# Patient Record
Sex: FEMALE | Race: WHITE | NOT HISPANIC OR LATINO | Employment: OTHER | URBAN - METROPOLITAN AREA
[De-identification: names, ages, dates, MRNs, and addresses within clinical notes are randomized per-mention and may not be internally consistent; named-entity substitution may affect disease eponyms.]

---

## 2017-02-02 ENCOUNTER — PROCEDURE VISIT (OUTPATIENT)
Dept: OBSTETRICS AND GYNECOLOGY | Facility: CLINIC | Age: 62
End: 2017-02-02

## 2017-02-02 VITALS — WEIGHT: 104 LBS | SYSTOLIC BLOOD PRESSURE: 110 MMHG | DIASTOLIC BLOOD PRESSURE: 64 MMHG

## 2017-02-02 DIAGNOSIS — Z00.00 ANNUAL PHYSICAL EXAM: Primary | ICD-10-CM

## 2017-02-02 PROBLEM — M06.9 RHEUMATOID ARTHRITIS INVOLVING MULTIPLE SITES (HCC): Status: ACTIVE | Noted: 2017-02-02

## 2017-02-02 PROCEDURE — 99396 PREV VISIT EST AGE 40-64: CPT | Performed by: OBSTETRICS & GYNECOLOGY

## 2017-02-02 RX ORDER — FOLIC ACID 1 MG/1
TABLET ORAL
COMMUNITY
Start: 2017-02-01

## 2017-02-02 NOTE — PROGRESS NOTES
Subjective     Chief Complaint   Patient presents with   • Gynecologic Exam     No concern this visit.       Carli Lea is a 61 y.o. year old  presenting to be seen for her annual exam.      She is sexually active.  In the past 12 months there have not been new sexual partners.  Condoms are not typically used.  She would not like to be screened for STD's at today's exam.     She exercises regularly: yes.  She wears her seat belt: yes.  She has concerns about domestic violence: no.  She has noticed changes in height: no    GYN screening history:  · Last pap: she does not know results of her last PAP.    No Additional Complaints Reported    The following portions of the patient's history were reviewed and updated as appropriate:vital signs, allergies, current medications, past medical history, past social history, past surgical history and problem list.    Review of Systems  Constitutional: negative for fever, chills, activity change, appetite change, fatigue and unexpected weight change.  Respiratory: negative  Cardiovascular: negative  Gastrointestinal: negative  Genitourinary:negative  Integument/breast: negative  Musculoskeletal:negative     Physical Exam    Objective     Visit Vitals   • /64   • Wt 104 lb (47.2 kg)       General:  well developed; well nourished  no acute distress  obese - There is no height or weight on file to calculate BMI.   Skin:  No suspicious lesions seen   Thyroid: normal to inspection and palpation   Lungs:  breathing is unlabored  clear to auscultation bilaterally   Heart:  regular rate and rhythm, S1, S2 normal, no murmur, click, rub or gallop   Breasts:  Examined in supine position  Symmetric without masses or skin dimpling  Nipples normal without inversion, lesions or discharge  There are no palpable axillary nodes   Abdomen: soft, non-tender; no masses  no umbilical or inginual hernias are present  no hepato-splenomegaly   Pelvis: Clinical staff was present for  exam  External genitalia:  normal appearance of the external genitalia including Bartholin's and [place]'s glands.  :  urethral meatus normal; urethral hypermobility is absent.  Vaginal:  atrophic mucosal changes are present;  Cervix:  normal appearance.  Uterus:  normal size, shape and consistency.  Adnexa:  normal bimanual exam of the adnexa.         Lab Review   No data reviewed    Imaging  No data reviewed    Assessment/Plan     ASSESSMENT  1. Annual physical exam        PLAN  No orders of the defined types were placed in this encounter.    New Medications Ordered This Visit   Medications   • folic acid (FOLVITE) 1 MG tablet   • methotrexate 2.5 MG tablet   • InFLIXimab (REMICADE IV)     Sig: Infuse  into a venous catheter.         Follow up: 1 year(s)         This note was electronically signed.    Jamison Oglesby MD  February 2, 2017

## 2018-03-05 ENCOUNTER — PROCEDURE VISIT (OUTPATIENT)
Dept: OBSTETRICS AND GYNECOLOGY | Facility: CLINIC | Age: 63
End: 2018-03-05

## 2018-03-05 VITALS
BODY MASS INDEX: 25.4 KG/M2 | HEART RATE: 62 BPM | WEIGHT: 121 LBS | DIASTOLIC BLOOD PRESSURE: 68 MMHG | HEIGHT: 58 IN | SYSTOLIC BLOOD PRESSURE: 110 MMHG

## 2018-03-05 DIAGNOSIS — Z00.00 ANNUAL PHYSICAL EXAM: Primary | ICD-10-CM

## 2018-03-05 PROCEDURE — 99396 PREV VISIT EST AGE 40-64: CPT | Performed by: OBSTETRICS & GYNECOLOGY

## 2018-03-05 NOTE — PROGRESS NOTES
"Subjective     Chief Complaint   Patient presents with   • Gynecologic Exam     pap       Carli Lea is a 62 y.o. year old  presenting to be seen for her annual exam.      She is sexually active.  In the past 12 months there have not been new sexual partners.  Condoms are not typically used.  She would not like to be screened for STD's at today's exam.     She exercises regularly: yes.  She wears her seat belt: yes.  She has concerns about domestic violence: no.  She has noticed changes in height: no    GYN screening history:  · Last pap: she reports her last PAP was normal  · Last mammogram: she reports her last mammogram was normal  · Last fasting lipid profile: she reports her last lipid panel was normal  · Last colonoscopy: she reports her last colonoscopy was normal  · Last DEXA: she reports her last DEXA was normal.    No Additional Complaints Reported    The following portions of the patient's history were reviewed and updated as appropriate:vital signs, allergies, current medications, past medical history, past social history, past surgical history and problem list.    Review of Systems  Pertinent items are noted in HPI.     Physical Exam    Objective     /68  Pulse 62  Ht 147.3 cm (58\")  Wt 54.9 kg (121 lb)  Breastfeeding? No  BMI 25.29 kg/m2    General:  well developed; well nourished  no acute distress   Constitutional: healthy   Skin:  No suspicious lesions seen   Thyroid: normal to inspection and palpation   Lungs:  breathing is unlabored  clear to auscultation bilaterally   Heart:  regular rate and rhythm, S1, S2 normal, no murmur, click, rub or gallop   Breasts:  Examined in supine position  Symmetric without masses or skin dimpling  Nipples normal without inversion, lesions or discharge  There are no palpable axillary nodes   Abdomen: soft, non-tender; no masses  no umbilical or inginual hernias are present  no hepato-splenomegaly   Pelvis: Clinical staff was present for " exam  External genitalia:  normal appearance of the external genitalia including Bartholin's and [place]'s glands.  :  urethral meatus normal; urethral hypermobility is absent.  Vaginal:  atrophic mucosal changes are present;  Cervix:  normal appearance  Uterus:  normal size, shape and consistency  Adnexa:  normal bimanual exam of the adnexa.   Musculoskeletal: negative   Neuro: normal without focal findings, mental status, speech normal, alert and oriented x3 and ISSAC   Psych: oriented to time, place and person, mood and affect are within normal limits, pt is a good historian; no memory problems were noted       Lab Review   No data reviewed    Imaging  Mammogram report    Assessment/Plan     ASSESSMENT  1. Annual physical exam        PLAN  No orders of the defined types were placed in this encounter.    No orders of the defined types were placed in this encounter.        Follow up: 1 year(s)         This note was electronically signed.    Jamison Oglesby MD  March 5, 2018

## 2018-03-12 ENCOUNTER — TELEPHONE (OUTPATIENT)
Dept: OBSTETRICS AND GYNECOLOGY | Facility: CLINIC | Age: 63
End: 2018-03-12

## 2019-04-11 ENCOUNTER — OFFICE VISIT (OUTPATIENT)
Dept: OBSTETRICS AND GYNECOLOGY | Facility: CLINIC | Age: 64
End: 2019-04-11

## 2019-04-11 VITALS
SYSTOLIC BLOOD PRESSURE: 118 MMHG | HEIGHT: 58 IN | WEIGHT: 114 LBS | DIASTOLIC BLOOD PRESSURE: 62 MMHG | BODY MASS INDEX: 23.93 KG/M2

## 2019-04-11 DIAGNOSIS — M81.0 OSTEOPOROSIS, UNSPECIFIED OSTEOPOROSIS TYPE, UNSPECIFIED PATHOLOGICAL FRACTURE PRESENCE: ICD-10-CM

## 2019-04-11 DIAGNOSIS — Z00.00 MEDICARE ANNUAL WELLNESS VISIT, INITIAL: Primary | ICD-10-CM

## 2019-04-11 DIAGNOSIS — Z12.39 BREAST SCREENING: ICD-10-CM

## 2019-04-11 PROCEDURE — 99397 PER PM REEVAL EST PAT 65+ YR: CPT | Performed by: OBSTETRICS & GYNECOLOGY

## 2019-04-11 NOTE — PROGRESS NOTES
"Subjective     Chief Complaint   Patient presents with   • Gynecologic Exam     pap smear       Carli Lea is a 63 y.o. year old  presenting to be seen for her annual exam.      She is sexually active.  In the past 12 months there have not been new sexual partners.  Condoms are not typically used.  She would not like to be screened for STD's at today's exam.     She exercises regularly: yes.  She wears her seat belt: yes.  She has concerns about domestic violence: no.  She has noticed changes in height: no    GYN screening history:  · Last pap: HPV  · Last mammogram: she reports her last mammogram was normal  · Last colonoscopy: Home test neg  · Last DEXA: stable osteopenia.    No Additional Complaints Reported    The following portions of the patient's history were reviewed and updated as appropriate:vital signs, allergies, current medications, past medical history, past social history, past surgical history and problem list.    Review of Systems  Pertinent items are noted in HPI.     Physical Exam    Objective     /62   Ht 147.3 cm (57.99\")   Wt 51.7 kg (114 lb)   Breastfeeding? No   BMI 23.83 kg/m²     General:  well developed; well nourished  no acute distress   Constitutional: thin and healthy   Skin:  No suspicious lesions seen   Thyroid: normal to inspection and palpation   Lungs:  breathing is unlabored  clear to auscultation bilaterally   Heart:  regular rate and rhythm, S1, S2 normal, no murmur, click, rub or gallop   Breasts:  Examined in supine position  Symmetric without masses or skin dimpling  Nipples normal without inversion, lesions or discharge  There are no palpable axillary nodes   Abdomen: soft, non-tender; no masses  no umbilical or inginual hernias are present  no hepato-splenomegaly   Pelvis: Clinical staff was present for exam  External genitalia:  normal appearance of the external genitalia including Bartholin's and [place]'s glands.  :  urethral meatus normal; urethral " hypermobility is absent.  Vaginal:  atrophic mucosal changes are present;  Cervix:  normal appearance  Uterus:  normal size, shape and consistency  Adnexa:  normal bimanual exam of the adnexa.   Musculoskeletal: negative   Neuro: normal without focal findings, mental status, speech normal, alert and oriented x3 and ISSAC   Psych: oriented to time, place and person, mood and affect are within normal limits, pt is a good historian; no memory problems were noted       Lab Review   No data reviewed    Imaging  No data reviewed    Assessment/Plan     ASSESSMENT  1. Medicare annual wellness visit, initial    2. Osteoporosis, unspecified osteoporosis type, unspecified pathological fracture presence    3. Breast screening        PLAN  Orders Placed This Encounter   Procedures   • Mammo Screening Digital Tomosynthesis Bilateral With CAD     Standing Status:   Future     Standing Expiration Date:   4/10/2020     Order Specific Question:   Reason for Exam:     Answer:   screen     No orders of the defined types were placed in this encounter.        Follow up: 1 year(s)         This note was electronically signed.    Jamiosn Oglesby MD  April 11, 2019

## 2021-04-26 ENCOUNTER — TELEPHONE (OUTPATIENT)
Dept: OBSTETRICS AND GYNECOLOGY | Facility: CLINIC | Age: 66
End: 2021-04-26

## 2021-04-26 NOTE — TELEPHONE ENCOUNTER
----- Message from Jamison Oglesby MD sent at 4/25/2021  1:49 PM EDT -----      ----- Message -----  From: Tara Mike, RegWen Robbins  Sent: 4/23/2021   2:58 PM EDT  To: Jamison Oglesby MD

## 2021-04-26 NOTE — TELEPHONE ENCOUNTER
Patient advised BIOIQ fit test not performed patient will need complete colorectal cancer screening.  Patient state she has an appointment with her PCP Maureen Cox in June and will get scheduled

## 2024-06-21 ENCOUNTER — TELEPHONE (OUTPATIENT)
Age: 69
End: 2024-06-21
Payer: MEDICARE

## 2024-06-21 RX ORDER — ALENDRONATE SODIUM 70 MG/1
70 TABLET ORAL WEEKLY
Qty: 12 TABLET | Refills: 1 | Status: SHIPPED | OUTPATIENT
Start: 2024-06-21

## 2024-06-21 NOTE — TELEPHONE ENCOUNTER
Pt came in to get appt scheduled due to no one answering the phones. I [facility] in the Rx [facility]. Pt is getting appt scheduled.

## 2024-07-24 ENCOUNTER — OFFICE VISIT (OUTPATIENT)
Age: 69
End: 2024-07-24
Payer: MEDICARE

## 2024-07-24 VITALS
WEIGHT: 110.7 LBS | HEART RATE: 76 BPM | TEMPERATURE: 97.5 F | SYSTOLIC BLOOD PRESSURE: 114 MMHG | BODY MASS INDEX: 23.24 KG/M2 | HEIGHT: 58 IN | DIASTOLIC BLOOD PRESSURE: 60 MMHG

## 2024-07-24 DIAGNOSIS — Z79.899 IMMUNODEFICIENCY DUE TO TREATMENT WITH IMMUNOSUPPRESSIVE MEDICATION: ICD-10-CM

## 2024-07-24 DIAGNOSIS — D84.821 IMMUNODEFICIENCY DUE TO TREATMENT WITH IMMUNOSUPPRESSIVE MEDICATION: ICD-10-CM

## 2024-07-24 DIAGNOSIS — M81.0 OSTEOPOROSIS, UNSPECIFIED OSTEOPOROSIS TYPE, UNSPECIFIED PATHOLOGICAL FRACTURE PRESENCE: ICD-10-CM

## 2024-07-24 DIAGNOSIS — M05.79 RHEUMATOID ARTHRITIS INVOLVING MULTIPLE SITES WITH POSITIVE RHEUMATOID FACTOR: Primary | ICD-10-CM

## 2024-07-24 DIAGNOSIS — R53.83 FATIGUE, UNSPECIFIED TYPE: ICD-10-CM

## 2024-07-24 DIAGNOSIS — Z78.0 POST-MENOPAUSE: ICD-10-CM

## 2024-07-24 DIAGNOSIS — Z79.899 ENCOUNTER FOR LONG-TERM (CURRENT) USE OF HIGH-RISK MEDICATION: ICD-10-CM

## 2024-07-24 PROBLEM — Z79.60 IMMUNODEFICIENCY DUE TO TREATMENT WITH IMMUNOSUPPRESSIVE MEDICATION: Status: ACTIVE | Noted: 2024-07-24

## 2024-07-24 PROBLEM — M54.50 LOW BACK PAIN: Status: ACTIVE | Noted: 2021-06-03

## 2024-07-24 PROBLEM — E78.5 DYSLIPIDEMIA: Status: ACTIVE | Noted: 2021-06-02

## 2024-07-24 PROBLEM — T45.1X5A IMMUNODEFICIENCY DUE TO TREATMENT WITH IMMUNOSUPPRESSIVE MEDICATION: Status: ACTIVE | Noted: 2024-07-24

## 2024-07-24 PROCEDURE — 1159F MED LIST DOCD IN RCRD: CPT | Performed by: NURSE PRACTITIONER

## 2024-07-24 PROCEDURE — 1160F RVW MEDS BY RX/DR IN RCRD: CPT | Performed by: NURSE PRACTITIONER

## 2024-07-24 PROCEDURE — G2211 COMPLEX E/M VISIT ADD ON: HCPCS | Performed by: NURSE PRACTITIONER

## 2024-07-24 PROCEDURE — 99214 OFFICE O/P EST MOD 30 MIN: CPT | Performed by: NURSE PRACTITIONER

## 2024-07-24 RX ORDER — METHOTREXATE 2.5 MG/1
17.5 TABLET ORAL WEEKLY
Qty: 35 TABLET | Refills: 2 | Status: CANCELLED | OUTPATIENT
Start: 2024-07-24

## 2024-07-24 RX ORDER — ALENDRONATE SODIUM 70 MG/1
70 TABLET ORAL WEEKLY
Qty: 12 TABLET | Refills: 1 | Status: CANCELLED | OUTPATIENT
Start: 2024-07-24

## 2024-07-24 RX ORDER — FOLIC ACID 1 MG/1
1 TABLET ORAL DAILY
Qty: 30 TABLET | Refills: 3 | Status: CANCELLED | OUTPATIENT
Start: 2024-07-24

## 2024-07-24 NOTE — PROGRESS NOTES
Office Visit       Date: 07/24/2024   Patient Name: Carli Lea  MRN: [id number]  YOB: 1955    Referring Physician: Maureen Reid MD     Chief Complaint:   Chief Complaint   Patient presents with    Osteoporosis    Rheumatoid Arthritis       History of Present Illness: Carli Lea is a 69 y.o. female who is here today for rheumatoid arthritis and osteoporosis.    Doing well. No joint swelling. No problems with MTX or Simponi aria. No infections or problems with infusions. No problems with alendronate.      Subjective   Review of systems:  Review of Systems   All other systems reviewed and are negative.      Past Medical History:   Past Medical History:   Diagnosis Date    Arthritis     Kidney stones     Osteoporosis        Past Surgical History:   Past Surgical History:   Procedure Laterality Date    COLONOSCOPY      TUBAL ABDOMINAL LIGATION         Family History:   Family History   Problem Relation Age of Onset    Diabetes Father        Social History:   Social History     Socioeconomic History    Marital status: Single   Tobacco Use    Smoking status: Never    Smokeless tobacco: Never   Vaping Use    Vaping status: Never Used   Substance and Sexual Activity    Alcohol use: Yes     Comment: soc    Drug use: Never    Sexual activity: Defer       Medications:   Current Outpatient Medications:     alendronate (Fosamax) 70 MG tablet, Take 1 tablet by mouth 1 (One) Time Per Week. 1 by mouth weekly on an empty stomach, do not lie down or eat for 1/2 hour after taking, Disp: 12 tablet, Rfl: 1    folic acid (FOLVITE) 1 MG tablet, , Disp: , Rfl:     Golimumab (SIMPONI ARIA IV), Infuse  into a venous catheter. Every 8 weeks, Disp: , Rfl:     methotrexate 2.5 MG tablet, , Disp: , Rfl:     Allergies: No Known Allergies    I reviewed the patient's chief complaint, history of present illness, review of systems, past medical history, surgical history, family history, social history,  "medications and allergy list.     Objective    Vital Signs:   Vitals:    07/24/24 1459   BP: 114/60   BP Location: Left arm   Patient Position: Sitting   Cuff Size: Adult   Pulse: 76   Temp: 97.5 °F (36.4 °C)   Weight: 50.2 kg (110 lb 11.2 oz)   Height: 147.3 cm (57.99\")   PainSc:   3     Body mass index is 23.14 kg/m².        Physical Exam:  Physical Exam  Constitutional:       Appearance: Normal appearance.   HENT:      Head: Normocephalic and atraumatic.   Eyes:      Conjunctiva/sclera: Conjunctivae normal.      Pupils: Pupils are equal, round, and reactive to light.   Cardiovascular:      Rate and Rhythm: Normal rate and regular rhythm.      Heart sounds: Normal heart sounds.   Pulmonary:      Effort: Pulmonary effort is normal.      Breath sounds: Normal breath sounds.   Musculoskeletal:         General: Normal range of motion.      Cervical back: Normal range of motion.      Comments: Osteoarthritic changes are seen in the PIP and DIP joints.   Skin:     General: Skin is warm and dry.   Neurological:      General: No focal deficit present.      Mental Status: She is alert and oriented to person, place, and time.   Psychiatric:         Mood and Affect: Mood normal.         Behavior: Behavior normal.         Thought Content: Thought content normal.         Judgment: Judgment normal.         Metrics  HUERTAS-28 (ESR): 1.38 (Remission)  HUERTAS-28 (CRP): 1.22 (Remission)  Tender (HUERTAS-28): 0 / 28   Swollen (HUERTAS-28): 0 / 28     This Exam  Provider Global: 1 mm  Patient Global: 1 mm  ESR: 7 mm/hr  CRP: 1 mg/L         Results Review:   Imaging Results (Last 24 Hours)       ** No results found for the last 24 hours. **            Assessment / Plan    Assessment/Plan:   Diagnoses and all orders for this visit:    1. Rheumatoid arthritis involving multiple sites with positive rheumatoid factor (Primary)  Assessment & Plan:  PAUCIARTICULAR ONSET 1999. RF POSITIVE. PLAQUENIL [id number]. SSA [id number]. MTX 2006-CURRENT. INFLIXIMAB " [id number]  Simponi aria started 2023.    Doing well today.  Continue methotrexate 17.5 mg weekly and daily folic acid.  Continue IV Simponi Aria.  Tender joint count is 0, swollen joint count is 0, physician global is 0, visual analog pain scale is 1, pt global is 3.  CDAI is 3 - low disease activity. Prognosis is good.   Plan is q 3 month monitoring exams and q 6 week labs.    Orders:  -     Comprehensive Metabolic Panel; Standing  -     C-reactive Protein; Standing  -     Sedimentation Rate; Standing  -     CBC Auto Differential; Standing    2. Immunodeficiency due to treatment with immunosuppressive medication  Assessment & Plan:  Simponi Aria infusions well-tolerated.  No recent infections.  Negative QTB 1/3/2024  Hep B core antibody negative 2/27/2024      Orders:  -     Comprehensive Metabolic Panel; Standing  -     C-reactive Protein; Standing  -     Sedimentation Rate; Standing  -     CBC Auto Differential; Standing    3. Encounter for long-term (current) use of high-risk medication  Assessment & Plan:  Methotrexate well-tolerated.  No recent infections.  Reminded her to hold methotrexate for any illness or infection.  Continue labs every 4 to 6 weeks.    Labs 5/10/2024 stable.    Orders:  -     Comprehensive Metabolic Panel; Standing  -     C-reactive Protein; Standing  -     Sedimentation Rate; Standing  -     CBC Auto Differential; Standing    4. Fatigue, unspecified type  Assessment & Plan:  Stable      5. Osteoporosis, unspecified osteoporosis type, unspecified pathological fracture presence  Assessment & Plan:  3/22 scan with mild osteoporosis lumbar spine with T-score -2.5.  Osteopenia left femoral neck T-score -2.2.  Fosamax started April 2022    She is tolerating Fosamax well.  Repeat DEXA was due 3/2024- will arrange  Continue daily exercise. She runs 3 miles daily and does yoga.  Continue daily calcium and vitamin D supplements.      6. Post-menopause  -     DEXA Bone Density Axial;  Future        Follow Up:   Return in about 3 months (around 10/24/2024) for Dr. Jose David NP.        STEPHIE Corrales  [facility] [place] [facility]

## 2024-07-24 NOTE — ASSESSMENT & PLAN NOTE
Simponi Aria infusions well-tolerated.  No recent infections.  Negative QTB 1/3/2024  Hep B core antibody negative 2/27/2024

## 2024-07-24 NOTE — ASSESSMENT & PLAN NOTE
PAUCIARTICULAR ONSET 1999. RF POSITIVE. PLAQUENIL [id number]. SSA [id number]. MTX 2006-CURRENT. INFLIXIMAB [id number]  Simponi aria started 2023.    Doing well today.  Continue methotrexate 17.5 mg weekly and daily folic acid.  Continue IV Simponi Aria.  Tender joint count is 0, swollen joint count is 0, physician global is 0, visual analog pain scale is 1, pt global is 3.  CDAI is 3 - low disease activity. Prognosis is good.   Plan is q 3 month monitoring exams and q 6 week labs.

## 2024-07-24 NOTE — ASSESSMENT & PLAN NOTE
3/22 scan with mild osteoporosis lumbar spine with T-score -2.5.  Osteopenia left femoral neck T-score -2.2.  Fosamax started April 2022    She is tolerating Fosamax well.  Repeat DEXA was due 3/2024- will arrange  Continue daily exercise. She runs 3 miles daily and does yoga.  Continue daily calcium and vitamin D supplements.

## 2024-07-24 NOTE — ASSESSMENT & PLAN NOTE
Methotrexate well-tolerated.  No recent infections.  Reminded her to hold methotrexate for any illness or infection.  Continue labs every 4 to 6 weeks.    Labs 5/10/2024 stable.

## 2024-08-14 ENCOUNTER — HOSPITAL ENCOUNTER (OUTPATIENT)
Dept: BONE DENSITY | Facility: HOSPITAL | Age: 69
Discharge: HOME | End: 2024-08-14
Admitting: NURSE PRACTITIONER
Payer: MEDICARE

## 2024-08-14 DIAGNOSIS — Z78.0 POST-MENOPAUSE: ICD-10-CM

## 2024-08-14 PROCEDURE — 77080 DXA BONE DENSITY AXIAL: CPT

## 2024-12-02 RX ORDER — ALENDRONATE SODIUM 70 MG/1
TABLET ORAL
Qty: 12 TABLET | Refills: 3 | OUTPATIENT
Start: 2024-12-02

## 2024-12-02 NOTE — TELEPHONE ENCOUNTER
Patient needs appointment for alendronate refill. Last seen 7/24/24, note indicates to return in about 3 months. No future appointment scheduled.    HUB OK TO RELAY

## 2024-12-05 ENCOUNTER — TELEPHONE (OUTPATIENT)
Age: 69
End: 2024-12-05
Payer: MEDICARE

## 2024-12-09 ENCOUNTER — PATIENT MESSAGE (OUTPATIENT)
Age: 69
End: 2024-12-09
Payer: MEDICARE

## 2024-12-09 RX ORDER — METHOTREXATE 2.5 MG/1
17.5 TABLET ORAL WEEKLY
Qty: 84 TABLET | Refills: 0 | Status: SHIPPED | OUTPATIENT
Start: 2024-12-09

## 2024-12-10 ENCOUNTER — TELEPHONE (OUTPATIENT)
Age: 69
End: 2024-12-10
Payer: MEDICARE

## 2024-12-10 RX ORDER — ALENDRONATE SODIUM 70 MG/1
70 TABLET ORAL WEEKLY
Qty: 12 TABLET | Refills: 1 | Status: SHIPPED | OUTPATIENT
Start: 2024-12-10

## 2025-01-02 ENCOUNTER — PATIENT MESSAGE (OUTPATIENT)
Age: 70
End: 2025-01-02
Payer: MEDICARE

## 2025-03-13 NOTE — ASSESSMENT & PLAN NOTE
3/22 scan with mild osteoporosis lumbar spine with T-score -2.5.  Osteopenia left femoral neck T-score -2.2.  Fosamax started April 2022  DEXA 8/14/2024: Lumbar spine -1.9, left total hip -1.1, left femoral neck -2.0, right total hip -1.2, right femoral neck -2.1    She is tolerating Fosamax well.  Repeat DEXA due 8/2026  Continue daily exercise. She runs 3 miles daily and does yoga.  Continue daily calcium and vitamin D supplements.

## 2025-03-13 NOTE — ASSESSMENT & PLAN NOTE
Simponi Aria infusions well-tolerated.  No recent infections.  Negative QTB 1/3/2024- update with next labs  Hep B core antibody negative 2/27/2024

## 2025-03-13 NOTE — ASSESSMENT & PLAN NOTE
Methotrexate well-tolerated.  No recent infections.  Reminded her to hold methotrexate for any illness or infection.    Continue labs every 4 to 6 weeks. New order given.  Labs 3/17/25 normal

## 2025-03-13 NOTE — PROGRESS NOTES
Office Visit       Date: 03/18/2025   Patient Name: Carli Lea  MRN: [id number]  YOB: 1955    Referring Physician: Maureen Reid MD     Chief Complaint:   Chief Complaint   Patient presents with    Follow-up    Rheumatoid Arthritis       History of Present Illness: Carli Lea is a 69 y.o. female who is here today for rheumatoid arthritis and osteoporosis.    Doing well. No joint swelling.  Occasional pain in the left knee and CMC joints.  No problems with MTX or Simponi aria. No infections or problems with infusions. No problems with alendronate.    Subjective   Review of systems:  Positive for indigestion, joint pain.  Otherwise negative ROS.    Past Medical History:   Past Medical History:   Diagnosis Date    Arthritis     Kidney stones     Osteoporosis        Past Surgical History:   Past Surgical History:   Procedure Laterality Date    COLONOSCOPY      TUBAL ABDOMINAL LIGATION         Family History:   Family History   Problem Relation Age of Onset    Diabetes Father        Social History:   Social History     Socioeconomic History    Marital status: Single   Tobacco Use    Smoking status: Never    Smokeless tobacco: Never   Vaping Use    Vaping status: Never Used   Substance and Sexual Activity    Alcohol use: Yes     Comment: soc    Drug use: Never    Sexual activity: Defer       Medications:   Current Outpatient Medications:     alendronate (Fosamax) 70 MG tablet, Take 1 tablet by mouth 1 (One) Time Per Week. 1 by mouth weekly on an empty stomach, do not lie down or eat for 1/2 hour after taking, Disp: 12 tablet, Rfl: 1    Golimumab (SIMPONI ARIA IV), Infuse  into a venous catheter. Every 8 weeks, Disp: , Rfl:     methotrexate 2.5 MG tablet, Take 7 tablets by mouth 1 (One) Time Per Week., Disp: 84 tablet, Rfl: 0    Allergies: No Known Allergies    I reviewed the patient's chief complaint, history of present illness, review of systems, past medical history,  "surgical history, family history, social history, medications and allergy list.     Objective    Vital Signs:   Vitals:    03/18/25 1407   BP: 120/62   Pulse: 68   Temp: 97.1 °F (36.2 °C)   Weight: 50.2 kg (110 lb 11.2 oz)   Height: 147.3 cm (58\")   PainSc: 2        Body mass index is 23.14 kg/m².    Defer to PCP    Physical Exam:  Physical Exam  Constitutional:       Appearance: Normal appearance.   HENT:      Head: Normocephalic and atraumatic.   Eyes:      Conjunctiva/sclera: Conjunctivae normal.      Pupils: Pupils are equal, round, and reactive to light.   Cardiovascular:      Rate and Rhythm: Normal rate and regular rhythm.      Heart sounds: Normal heart sounds.   Pulmonary:      Effort: Pulmonary effort is normal.      Breath sounds: Normal breath sounds.   Musculoskeletal:         General: Normal range of motion.      Cervical back: Normal range of motion.      Comments: Osteoarthritic changes are seen in the PIP and DIP joints.   Skin:     General: Skin is warm and dry.   Neurological:      General: No focal deficit present.      Mental Status: She is alert and oriented to person, place, and time.   Psychiatric:         Mood and Affect: Mood normal.         Behavior: Behavior normal.         Thought Content: Thought content normal.         Judgment: Judgment normal.         Metrics  HUERTAS-28 (ESR): 1.41 (Remission)  HUERTAS-28 (CRP): 1.49 (Remission)  Tender (HUERTAS-28): 0 / 28   Swollen (HUERTAS-28): 0 / 28     This Exam  Provider Global: 10 / 100  Patient Global: 20 / 100  ESR: 5 mm/hr  CRP: 1 mg/L          Assessment / Plan    Assessment/Plan:   Diagnoses and all orders for this visit:    1. Rheumatoid arthritis involving multiple sites, unspecified whether rheumatoid factor present (Primary)  Assessment & Plan:  PAUCIARTICULAR ONSET 1999. RF POSITIVE. PLAQUENIL [id number]. SSA [id number]. MTX 2006-CURRENT. INFLIXIMAB [id number]  Simponi aria started 2023.    Doing well today.  Continue methotrexate 17.5 mg weekly and " daily folic acid (she gets folic acid OTC)  Continue IV Simponi Aria.  Tender joint count is 0, swollen joint count is 0, physician global is 0, visual analog pain scale is 2, pt global is 2.  CDAI is 2 - low disease activity. Prognosis is good.   Labs 3/17/25 normal  Plan is q 3 month monitoring exams and q 6 week labs    Orders:  -     Comprehensive Metabolic Panel; Standing  -     C-reactive Protein; Standing  -     Sedimentation Rate; Standing  -     CBC Auto Differential; Standing    2. Immunodeficiency due to treatment with immunosuppressive medication  Assessment & Plan:  Simponi Aria infusions well-tolerated.  No recent infections.  Negative QTB 1/3/2024- update with next labs  Hep B core antibody negative 2/27/2024        3. Encounter for long-term (current) use of high-risk medication  Assessment & Plan:  Methotrexate well-tolerated.  No recent infections.  Reminded her to hold methotrexate for any illness or infection.    Continue labs every 4 to 6 weeks. New order given.  Labs 3/17/25 normal    Orders:  -     Comprehensive Metabolic Panel; Standing  -     C-reactive Protein; Standing  -     Sedimentation Rate; Standing  -     CBC Auto Differential; Standing    4. Osteoporosis, unspecified osteoporosis type, unspecified pathological fracture presence  Assessment & Plan:  3/22 scan with mild osteoporosis lumbar spine with T-score -2.5.  Osteopenia left femoral neck T-score -2.2.  Fosamax started April 2022  DEXA 8/14/2024: Lumbar spine -1.9, left total hip -1.1, left femoral neck -2.0, right total hip -1.2, right femoral neck -2.1    She is tolerating Fosamax well.  Repeat DEXA due 8/2026  Continue daily exercise. She runs 3 miles daily and does yoga.  Continue daily calcium and vitamin D supplements.      5. Chronic fatigue  Assessment & Plan:  Update QTB and hepatitis panel with next labs    Orders:  -     QuantiFERON-TB Gold Plus; Future  -     Hepatitis Panel, Acute; Future        Follow Up:   Return  in about 3 months (around 6/18/2025) for Dr. Main, STEPHIE Yang.        STEPHIE Corrales  [facility]

## 2025-03-13 NOTE — ASSESSMENT & PLAN NOTE
PAUCIARTICULAR ONSET 1999. RF POSITIVE. PLAQUENIL [id number]. SSA [id number]. MTX 2006-CURRENT. INFLIXIMAB [id number]  Simponi aria started 2023.    Doing well today.  Continue methotrexate 17.5 mg weekly and daily folic acid (she gets folic acid OTC)  Continue IV Simponi Aria.  Tender joint count is 0, swollen joint count is 0, physician global is 0, visual analog pain scale is 2, pt global is 2.  CDAI is 2 - low disease activity. Prognosis is good.   Labs 3/17/25 normal  Plan is q 3 month monitoring exams and q 6 week labs

## 2025-03-18 ENCOUNTER — OFFICE VISIT (OUTPATIENT)
Age: 70
End: 2025-03-18
Payer: MEDICARE

## 2025-03-18 VITALS
HEART RATE: 68 BPM | BODY MASS INDEX: 23.24 KG/M2 | HEIGHT: 58 IN | WEIGHT: 110.7 LBS | TEMPERATURE: 97.1 F | SYSTOLIC BLOOD PRESSURE: 120 MMHG | DIASTOLIC BLOOD PRESSURE: 62 MMHG

## 2025-03-18 DIAGNOSIS — M06.9 RHEUMATOID ARTHRITIS INVOLVING MULTIPLE SITES, UNSPECIFIED WHETHER RHEUMATOID FACTOR PRESENT: Primary | ICD-10-CM

## 2025-03-18 DIAGNOSIS — M81.0 OSTEOPOROSIS, UNSPECIFIED OSTEOPOROSIS TYPE, UNSPECIFIED PATHOLOGICAL FRACTURE PRESENCE: ICD-10-CM

## 2025-03-18 DIAGNOSIS — R53.82 CHRONIC FATIGUE: ICD-10-CM

## 2025-03-18 DIAGNOSIS — D84.821 IMMUNODEFICIENCY DUE TO TREATMENT WITH IMMUNOSUPPRESSIVE MEDICATION: ICD-10-CM

## 2025-03-18 DIAGNOSIS — Z79.899 IMMUNODEFICIENCY DUE TO TREATMENT WITH IMMUNOSUPPRESSIVE MEDICATION: ICD-10-CM

## 2025-03-18 DIAGNOSIS — Z79.899 ENCOUNTER FOR LONG-TERM (CURRENT) USE OF HIGH-RISK MEDICATION: ICD-10-CM

## 2025-03-18 PROCEDURE — 1159F MED LIST DOCD IN RCRD: CPT | Performed by: NURSE PRACTITIONER

## 2025-03-18 PROCEDURE — 1160F RVW MEDS BY RX/DR IN RCRD: CPT | Performed by: NURSE PRACTITIONER

## 2025-03-18 PROCEDURE — G2211 COMPLEX E/M VISIT ADD ON: HCPCS | Performed by: NURSE PRACTITIONER

## 2025-03-18 PROCEDURE — 99214 OFFICE O/P EST MOD 30 MIN: CPT | Performed by: NURSE PRACTITIONER

## 2025-03-18 RX ORDER — METHOTREXATE 2.5 MG/1
17.5 TABLET ORAL WEEKLY
Qty: 84 TABLET | Refills: 0 | Status: CANCELLED | OUTPATIENT
Start: 2025-03-18

## 2025-03-18 RX ORDER — ALENDRONATE SODIUM 70 MG/1
70 TABLET ORAL WEEKLY
Qty: 12 TABLET | Refills: 1 | Status: CANCELLED | OUTPATIENT
Start: 2025-03-18

## 2025-03-21 ENCOUNTER — TELEPHONE (OUTPATIENT)
Age: 70
End: 2025-03-21
Payer: MEDICARE

## 2025-03-21 NOTE — TELEPHONE ENCOUNTER
Faxed new order, insurance cards, and latest office note to [facility] at [phone] per their request

## 2025-03-28 RX ORDER — METHOTREXATE 2.5 MG/1
TABLET ORAL
Qty: 84 TABLET | Refills: 3 | Status: SHIPPED | OUTPATIENT
Start: 2025-03-28

## 2025-05-16 RX ORDER — ALENDRONATE SODIUM 70 MG/1
TABLET ORAL
Qty: 12 TABLET | Refills: 3 | Status: SHIPPED | OUTPATIENT
Start: 2025-05-16

## 2025-06-04 NOTE — ASSESSMENT & PLAN NOTE
Methotrexate well-tolerated.  No recent infections.  Reminded her to hold methotrexate for any illness or infection.    Continue labs every 6-8 weeks. Active standing order in chart  Labs 6/6/25 normal

## 2025-06-04 NOTE — ASSESSMENT & PLAN NOTE
Simponi Aria infusions well-tolerated.  No recent infections.  Negative QTB 1/3/2024- active order in her chart for update.   Hep B core antibody negative 2/27/2024

## 2025-06-04 NOTE — ASSESSMENT & PLAN NOTE
PAUCIARTICULAR ONSET 1999. RF POSITIVE.   Previous: PLAQUENIL [phone]. SSA [id number].  INFLIXIMAB 1641-2277  Current: MTX started 2006; Simponi aria started 2023.    RA is stable.   Continue methotrexate 17.5 mg weekly and daily folic acid (she gets folic acid OTC)- refill today   Continue IV Simponi Aria. She is requesting refill- message to pharmacy for refill has been sent.   Tender joint count is 0, swollen joint count is 1, physician global is 0, visual analog pain scale is 2, pt global is 1.  CDAI is 2 - low disease activity. Prognosis is good.   Labs 6/6/25 normal  She does Yoga   Plan is q 3 month monitoring exams and q 8 week labs. She gets her labs before her infusion.

## 2025-06-04 NOTE — PROGRESS NOTES
"                         Office Follow Up      Date: 06/16/2025   Patient Name: Carli Lea  MRN: [id number]  YOB: 1955    Referring Physician: No ref. provider found     Chief Complaint:   Chief Complaint   Patient presents with    Follow-up    Rheumatoid Arthritis       History of Present Illness:   Carli Lea is a 70 y.o. female who is here today for follow up for rheumatoid arthritis and osteoporosis.     Today they rate their pain 2/10 with 30 minutes of early morning stiffness. We Prescribe MTX, fosamax, and Simponi aria.      She has not had any recent infection or illness. She feels that her RA is stable.       Subjective     Review of Systems positive for joint pain      Current Outpatient Medications:     alendronate (FOSAMAX) 70 MG tablet, Take 0.5 tablets by mouth Every 7 (Seven) Days., Disp: 12 tablet, Rfl: 3    methotrexate 2.5 MG tablet, Take 7 tablets by mouth 1 (One) Time Per Week., Disp: 84 tablet, Rfl: 3    folic acid (FOLVITE) 1 MG tablet, Take 1 tablet by mouth Daily., Disp: , Rfl:     Golimumab (SIMPONI ARIA IV), Infuse  into a venous catheter. Every 8 weeks, Disp: , Rfl:     No Known Allergies    I have reviewed and updated the patient's chief complaint, history of present illness, review of systems, past medical history, surgical history, family history, social history, medications and allergy list as appropriate.     Objective      Vitals:    06/16/25 1034   BP: 120/62   Pulse: 60   Temp: 98.3 °F (36.8 °C)   Weight: 49.3 kg (108 lb 9.6 oz)   Height: 147.3 cm (58\")   PainSc: 2      Body mass index is 22.7 kg/m².      Physical Exam  Constitutional:       Appearance: Normal appearance.   HENT:      Head: Normocephalic.   Eyes:      Pupils: Pupils are equal, round, and reactive to light.   Cardiovascular:      Rate and Rhythm: Regular rhythm.      Pulses: Normal pulses.      Heart sounds: Normal heart sounds.   Pulmonary:      Effort: Pulmonary effort is normal.      Breath " sounds: Normal breath sounds.   Musculoskeletal:      Cervical back: Normal range of motion and neck supple.      Comments: Very mild synovitis L 3rd finger   Scattered heberdens/buchards nodes.   Ulnar deviation bilateral hands      Skin:     General: Skin is warm.   Neurological:      General: No focal deficit present.      Mental Status: She is alert and oriented to person, place, and time.   Psychiatric:         Behavior: Behavior normal.         Thought Content: Thought content normal.        CBC          6/6/2025    08:19   CBC   WBC 5.33    RBC 4.39    Hemoglobin 13.8    Hematocrit 40.8    MCV 92.9    MCH 31.4    MCHC 33.8    RDW 13.6    Platelets 213        Procedures    Assessment / Plan      Assessment & Plan  Rheumatoid arthritis involving multiple sites, unspecified whether rheumatoid factor present  PAUCIARTICULAR ONSET 1999. RF POSITIVE.   Previous: PLAQUENIL 8971-4113. SSA 8516-7211.  INFLIXIMAB [id number]  Current: MTX started 2006; Simponi aria started 2023.    RA is stable.   Continue methotrexate 17.5 mg weekly and daily folic acid (she gets folic acid OTC)- refill today   Continue IV Simponi Aria. She is requesting refill- message to pharmacy for refill has been sent.   Tender joint count is 0, swollen joint count is 1, physician global is 0, visual analog pain scale is 2, pt global is 1.  CDAI is 2 - low disease activity. Prognosis is good.   Labs 6/6/25 normal  She does Yoga   Plan is q 3 month monitoring exams and q 8 week labs. She gets her labs before her infusion.         Immunodeficiency due to treatment with immunosuppressive medication  Simponi Aria infusions well-tolerated.  No recent infections.  Negative QTB 1/3/2024- active order in her chart for update.   Hep B core antibody negative 2/27/2024        Encounter for long-term (current) use of high-risk medication  Methotrexate well-tolerated.  No recent infections.  Reminded her to hold methotrexate for any illness or  infection.    Continue labs every 6-8 weeks. Active standing order in chart  Labs 6/6/25 normal        Osteoporosis, unspecified osteoporosis type, unspecified pathological fracture presence  3/22 scan with mild osteoporosis lumbar spine with T-score -2.5.  Osteopenia left femoral neck T-score -2.2.  Fosamax started April 2022  DEXA 8/14/2024: Lumbar spine -1.9, left total hip -1.1, left femoral neck -2.0, right total hip -1.2, right femoral neck -2.1    She is tolerating Fosamax well.  Repeat DEXA due 8/2026  Continue daily exercise. She runs 3 miles daily and does yoga.  Continue daily calcium and vitamin D supplements.          I attest that the documentation was copied from a previous note but is still current and accurate.     Follow Up:   Return in about 3 months (around 9/16/2025) for NP's, Dr. Main.      STEPHIE Yang  [facility]

## 2025-06-06 LAB
ALBUMIN SERPL-MCNC: 4.6 G/DL (ref 3.5–5.2)
ALBUMIN/GLOB SERPL: 1.5 G/DL
ALP SERPL-CCNC: 60 U/L (ref 39–117)
ALT SERPL-CCNC: 17 U/L (ref 1–33)
AST SERPL-CCNC: 20 U/L (ref 1–32)
BASOPHILS # BLD AUTO: 0.03 10*3/MM3 (ref 0–0.2)
BASOPHILS NFR BLD AUTO: 0.6 % (ref 0–1.5)
BILIRUB SERPL-MCNC: 0.7 MG/DL (ref 0–1.2)
BUN SERPL-MCNC: 14 MG/DL (ref 8–23)
BUN/CREAT SERPL: 22.2 (ref 7–25)
CALCIUM SERPL-MCNC: 9.2 MG/DL (ref 8.6–10.5)
CHLORIDE SERPL-SCNC: 105 MMOL/L (ref 98–107)
CO2 SERPL-SCNC: 22.2 MMOL/L (ref 22–29)
CREAT SERPL-MCNC: 0.63 MG/DL (ref 0.57–1)
CRP SERPL-MCNC: <0.3 MG/DL (ref 0–0.5)
EGFRCR SERPLBLD CKD-EPI 2021: 94.4 ML/MIN/1.73
EOSINOPHIL # BLD AUTO: 0.03 10*3/MM3 (ref 0–0.4)
EOSINOPHIL NFR BLD AUTO: 0.6 % (ref 0.3–6.2)
ERYTHROCYTE [DISTWIDTH] IN BLOOD BY AUTOMATED COUNT: 13.6 % (ref 12.3–15.4)
ERYTHROCYTE [SEDIMENTATION RATE] IN BLOOD BY WESTERGREN METHOD: 18 MM/HR (ref 0–30)
GLOBULIN SER CALC-MCNC: 3.1 GM/DL
GLUCOSE SERPL-MCNC: 103 MG/DL (ref 65–99)
HCT VFR BLD AUTO: 40.8 % (ref 34–46.6)
HGB BLD-MCNC: 13.8 G/DL (ref 12–15.9)
IMM GRANULOCYTES # BLD AUTO: 0.02 10*3/MM3 (ref 0–0.05)
IMM GRANULOCYTES NFR BLD AUTO: 0.4 % (ref 0–0.5)
LYMPHOCYTES # BLD AUTO: 1.29 10*3/MM3 (ref 0.7–3.1)
LYMPHOCYTES NFR BLD AUTO: 24.2 % (ref 19.6–45.3)
MCH RBC QN AUTO: 31.4 PG (ref 26.6–33)
MCHC RBC AUTO-ENTMCNC: 33.8 G/DL (ref 31.5–35.7)
MCV RBC AUTO: 92.9 FL (ref 79–97)
MONOCYTES # BLD AUTO: 0.51 10*3/MM3 (ref 0.1–0.9)
MONOCYTES NFR BLD AUTO: 9.6 % (ref 5–12)
NEUTROPHILS # BLD AUTO: 3.45 10*3/MM3 (ref 1.7–7)
NEUTROPHILS NFR BLD AUTO: 64.6 % (ref 42.7–76)
NRBC BLD AUTO-RTO: 0 /100 WBC (ref 0–0.2)
PLATELET # BLD AUTO: 213 10*3/MM3 (ref 140–450)
POTASSIUM SERPL-SCNC: 4.6 MMOL/L (ref 3.5–5.2)
PROT SERPL-MCNC: 7.7 G/DL (ref 6–8.5)
RBC # BLD AUTO: 4.39 10*6/MM3 (ref 3.77–5.28)
SODIUM SERPL-SCNC: 138 MMOL/L (ref 136–145)
WBC # BLD AUTO: 5.33 10*3/MM3 (ref 3.4–10.8)

## 2025-06-16 ENCOUNTER — TELEPHONE (OUTPATIENT)
Age: 70
End: 2025-06-16

## 2025-06-16 ENCOUNTER — OFFICE VISIT (OUTPATIENT)
Age: 70
End: 2025-06-16
Payer: MEDICARE

## 2025-06-16 VITALS
TEMPERATURE: 98.3 F | BODY MASS INDEX: 22.8 KG/M2 | HEIGHT: 58 IN | HEART RATE: 60 BPM | DIASTOLIC BLOOD PRESSURE: 62 MMHG | WEIGHT: 108.6 LBS | SYSTOLIC BLOOD PRESSURE: 120 MMHG

## 2025-06-16 DIAGNOSIS — M06.9 RHEUMATOID ARTHRITIS INVOLVING MULTIPLE SITES, UNSPECIFIED WHETHER RHEUMATOID FACTOR PRESENT: Primary | Chronic | ICD-10-CM

## 2025-06-16 DIAGNOSIS — M81.0 OSTEOPOROSIS, UNSPECIFIED OSTEOPOROSIS TYPE, UNSPECIFIED PATHOLOGICAL FRACTURE PRESENCE: ICD-10-CM

## 2025-06-16 DIAGNOSIS — T45.1X5A IMMUNODEFICIENCY DUE TO TREATMENT WITH IMMUNOSUPPRESSIVE MEDICATION: Chronic | ICD-10-CM

## 2025-06-16 DIAGNOSIS — Z79.60 IMMUNODEFICIENCY DUE TO TREATMENT WITH IMMUNOSUPPRESSIVE MEDICATION: Chronic | ICD-10-CM

## 2025-06-16 DIAGNOSIS — Z79.899 ENCOUNTER FOR LONG-TERM (CURRENT) USE OF HIGH-RISK MEDICATION: Chronic | ICD-10-CM

## 2025-06-16 DIAGNOSIS — D84.821 IMMUNODEFICIENCY DUE TO TREATMENT WITH IMMUNOSUPPRESSIVE MEDICATION: Chronic | ICD-10-CM

## 2025-06-16 RX ORDER — METHOTREXATE 2.5 MG/1
17.5 TABLET ORAL WEEKLY
Qty: 84 TABLET | Refills: 3 | Status: SHIPPED | OUTPATIENT
Start: 2025-06-16

## 2025-06-16 RX ORDER — ALENDRONATE SODIUM 70 MG/1
35 TABLET ORAL
Qty: 12 TABLET | Refills: 3 | Status: SHIPPED | OUTPATIENT
Start: 2025-06-16 | End: 2025-06-18 | Stop reason: SDUPTHER

## 2025-06-16 RX ORDER — FOLIC ACID 1 MG/1
1 TABLET ORAL DAILY
COMMUNITY

## 2025-06-16 NOTE — TELEPHONE ENCOUNTER
Spoke with [facility], patient's servicing location, and they stated her order is up to date and good until March 2026. They stated they do not need clinicals/labs or anything else at this time.

## 2025-06-18 DIAGNOSIS — M81.0 OSTEOPOROSIS, UNSPECIFIED OSTEOPOROSIS TYPE, UNSPECIFIED PATHOLOGICAL FRACTURE PRESENCE: Primary | ICD-10-CM

## 2025-06-18 RX ORDER — ALENDRONATE SODIUM 35 MG/1
35 TABLET ORAL
Qty: 12 TABLET | Refills: 1 | Status: SHIPPED | OUTPATIENT
Start: 2025-06-18 | End: 2025-06-23 | Stop reason: SDUPTHER

## 2025-06-23 ENCOUNTER — TELEPHONE (OUTPATIENT)
Age: 70
End: 2025-06-23
Payer: MEDICARE

## 2025-06-23 DIAGNOSIS — M81.0 OSTEOPOROSIS, UNSPECIFIED OSTEOPOROSIS TYPE, UNSPECIFIED PATHOLOGICAL FRACTURE PRESENCE: ICD-10-CM

## 2025-06-23 RX ORDER — ALENDRONATE SODIUM 35 MG/1
35 TABLET ORAL
Qty: 12 TABLET | Refills: 1 | Status: SHIPPED | OUTPATIENT
Start: 2025-06-23